# Patient Record
Sex: MALE | URBAN - METROPOLITAN AREA
[De-identification: names, ages, dates, MRNs, and addresses within clinical notes are randomized per-mention and may not be internally consistent; named-entity substitution may affect disease eponyms.]

---

## 2022-03-21 ENCOUNTER — TELEPHONE (OUTPATIENT)
Dept: PULMONOLOGY | Age: 72
End: 2022-03-21

## 2022-03-21 NOTE — TELEPHONE ENCOUNTER
Please advise patient is a self ref and is calling to get eval for [place] procedure. Please advise and order what testing is needed. Referred By: self     Reason: Hx of COPD eval for [place] procedure     Previous Testing: none     Insurance: med [employer]     New Testing Needed:all testing for zephyr     Appt Date/Time: he would like a sooner appt than May.

## 2022-03-21 NOTE — TELEPHONE ENCOUNTER
I do not find any notes from other physicians, pulmonary function study, or CT chest in the available records. At the very least, he would need an initial evaluation with PFT before we start talking about [place] valve.